# Patient Record
Sex: FEMALE | Race: WHITE | NOT HISPANIC OR LATINO | URBAN - METROPOLITAN AREA
[De-identification: names, ages, dates, MRNs, and addresses within clinical notes are randomized per-mention and may not be internally consistent; named-entity substitution may affect disease eponyms.]

---
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
## 2024-03-09 ENCOUNTER — NON-PROVIDER VISIT (OUTPATIENT)
Dept: URGENT CARE | Facility: PHYSICIAN GROUP | Age: 19
End: 2024-03-09

## 2024-03-09 DIAGNOSIS — Z11.1 PPD SCREENING TEST: Primary | ICD-10-CM

## 2024-03-09 PROCEDURE — 86580 TB INTRADERMAL TEST: CPT

## 2024-03-09 NOTE — PROGRESS NOTES
Suzan Delacruz is a 18 y.o. female here for a non-provider visit for PPD placement -- Step 1 of 1    Reason for PPD:  work requirement    1. TB evaluation questionnaire completed by patient? Yes      -  If any answers marked yes did you contact a provider prior to placing? Not Indicated  2.  Patient notified to return to clinic for reading on: Monday after 9:25am, Tuesdasy before 9:25am   3.  PPD Placement documentation completed on TB evaluation questionnaire? Yes  4.  Location of TB evaluation questionnaire filed:  [facility]

## 2024-03-11 ENCOUNTER — NON-PROVIDER VISIT (OUTPATIENT)
Dept: URGENT CARE | Facility: PHYSICIAN GROUP | Age: 19
End: 2024-03-11

## 2024-03-11 LAB — TB WHEAL 3D P 5 TU DIAM: 0 MM

## 2024-03-11 NOTE — PROGRESS NOTES
Suzan Delacruz is a 18 y.o. female here for a non-provider visit for PPD reading -- Step 1 of 1.      1.  Resulted in Epic under enter/edit results? Yes   2.  TB evaluation questionnaire scanned into chart and original given to patient?Yes      3. Was induration greater than 0 mm? No.      Routed to PCP? Yes

## 2024-04-16 ENCOUNTER — HOSPITAL ENCOUNTER (OUTPATIENT)
Dept: BEHAVIORAL HEALTH | Facility: MEDICAL CENTER | Age: 19
End: 2024-04-16
Attending: PSYCHIATRY & NEUROLOGY
Payer: COMMERCIAL

## 2024-04-16 DIAGNOSIS — F41.1 GAD (GENERALIZED ANXIETY DISORDER): ICD-10-CM

## 2024-04-16 PROCEDURE — 90791 PSYCH DIAGNOSTIC EVALUATION: CPT | Performed by: MARRIAGE & FAMILY THERAPIST

## 2024-04-16 ASSESSMENT — PATIENT HEALTH QUESTIONNAIRE - PHQ9
5. POOR APPETITE OR OVEREATING: 0 - NOT AT ALL
SUM OF ALL RESPONSES TO PHQ QUESTIONS 1-9: 9
CLINICAL INTERPRETATION OF PHQ2 SCORE: 3

## 2024-04-16 ASSESSMENT — ANXIETY QUESTIONNAIRES
4. TROUBLE RELAXING: NEARLY EVERY DAY
1. FEELING NERVOUS, ANXIOUS, OR ON EDGE: NEARLY EVERY DAY
2. NOT BEING ABLE TO STOP OR CONTROL WORRYING: NEARLY EVERY DAY
5. BEING SO RESTLESS THAT IT IS HARD TO SIT STILL: NEARLY EVERY DAY
7. FEELING AFRAID AS IF SOMETHING AWFUL MIGHT HAPPEN: SEVERAL DAYS
6. BECOMING EASILY ANNOYED OR IRRITABLE: NEARLY EVERY DAY
3. WORRYING TOO MUCH ABOUT DIFFERENT THINGS: NEARLY EVERY DAY
GAD7 TOTAL SCORE: 19

## 2024-04-16 NOTE — PROGRESS NOTES
"[facility]  INITIAL ASSESSMENT    Name: Suzan Delacruz  MRN: [id number]  : 2005  Age: 18 y.o.  Date of assessment: 2024  PCP: JUAN Jain M.D.  Persons in attendance: Patient  Total session time: 90 minutes      CHIEF COMPLAINT AND HISTORY OF PRESENTING PROBLEM:  (as stated by Patient):  Suzan Delacruz is a 18 y.o., White female referred for assessment by Dr. Kerry Ge.  Primary presenting issue includes No chief complaint on file.  Pt reports she was diagnosed with bipolar II depression and \"I get angry a lot.\" She said Dr. Ge recommended her to program.  Pt reports her therapist thought pt would benefit from intense therapy.  \"I do tend to lie to get out of things and not participate in group.\"  Pt reports she was admitted to [facility] about three years ago for suicide attempt. Pt reports she was in [place] going to college and she was feeling that  she could not focus.  \"I felt that I was slowly dying.  I wasn't going to class.  I am finishing on line.\"  Pt reports to be feeling anxious and mother notices she \"paces a lot.  The wheels are constantly turning.\"  Pt's mother reports that since pt has been home she had going out and has had sex with a couple of guys. When she comes home she feels guilty and has remorse.\"  Pt reports that she feels that she \"blacks out; and I don't really have emotion when I do stuff.\"  Mother reports that pt demonstrated similar behaviors around when COVID happened.\"  Mother reports that pt would \"sneak out of the house and not come home.  We were hearing things about pt having sex with random people and living recklessly.  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  Pt is tearful. She said she has had friend problems in middle adolescence.  \"I've lost a lot of friends.\"  Pt reports to experience \"impulsive behaviors.\"  Pt reports she fears being left out or " "forgotten.  Mother reports she noticed pt would \"lie\" and she noticed this became noticeable around 8th grade.  Pt reports that in 8th grade,friends at the time started to cut her out of their friend group.        3/20/2018     1:00 PM 4/16/2024    10:26 AM   Depression Screen (PHQ-2/PHQ-9)   PHQ-2 Total Score 0 3   PHQ-9 Total Score  9       Interpretation of PHQ-9 Total Score   Score Severity   1-4 No Depression   5-9 Mild Depression   10-14 Moderate Depression   15-19 Moderately Severe Depression   20-27 Severe Depression         4/16/2024    10:48 AM   JAMIE 7   JAMIE-7 Total Score 19       Interpretation of JAMIE 7 Total Score   Score Severity:  0-4 No Anxiety   5-9 Mild Anxiety  10-14 Moderate Anxiety  15-21 Severe Anxiety     FAMILY/SOCIAL HISTORY  Current living situation/household members: living at home with mother, father and brother.  Pt reports that her father \"doesn't really understand.  I feel I am disappointing my family.\"    Relevant family history/structure/dynamics: Pt is home from college; finishing course work on line and living with mother brother and father for the summer.  She plans to starts back to school online.    Current family/social stressors: Pt's family will be moving out of state withing the next six months.   Quality/quantity of current family and/or social support: mother is with pt today and she has a \"supportive family,\" even though she feels she is letting them down and they are disappointed in her.    Does patient/parent report a family history of behavioral health issues, diagnoses, or treatment? No  Family History   Problem Relation Age of Onset    Breast Cancer Mother     Diabetes Father     Breast Cancer Maternal Aunt     Diabetes Paternal Grandmother         BEHAVIORAL HEALTH TREATMENT HISTORY  Does patient/parent report a history of prior behavioral health treatment for patient? Yes:    Dates Level of Care Facilty/Provider Diagnosis/Problem Medications   Currently   [facility] " "[facility] Bipolar II  Lamotrigine; propranolol vraylar    After [facility]     [facility]  IP \"                                                          History of untreated behavioral health issues identified? No    MEDICAL HISTORY  Primary care behavioral health screenings: @PHQ@   Past medical/surgical history:   Past Medical History:   Diagnosis Date    Depression     Suicide attempt (HCC)       Past Surgical History:   Procedure Laterality Date    OTHER      ankle surgery    OTHER      wisdom teeth removal        Medication Allergies:  Patient has no known allergies.   Medical history provided by patient during current evaluation: none reported     Does patient/parent report nutritional concerns?  \"Im trying\"   Does patient/parent report change in appetite or weight loss/gain? Yes; lost 10 lbs since she has been home from college   Does patient/parent report history of eating disorder symptoms? Yes; binging   Does patient/parent report dental problem? No  Does patient/parent report physical pain? No     Does patient/parent report functional impact of medical, developmental, or pain issues?   no    EDUCATIONAL/LEARNING HISTORY  Is patient currently enrolled in a school/educational program? 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 No  Does patient identify impact of presenting issue on work functioning? No     HISTORY:  Does patient report current or past enlistment? No        SPIRITUAL/CULTURAL/IDENTITY:  What are the patient’s/family’s spiritual beliefs or practices? None reported   What is the patient’s cultural or ethnic background/identity? white  How does the patient identify their sexual orientation? Heterosexual   How does the patient identify their gender? She/her/hers  Does the patient identify any " "spiritual/cultural/identity factors as relevant to the presenting issue? No    LEGAL HISTORY  Has the patient ever been involved with juvenile, adult, or family legal systems? No   [If yes, trigger section below:]  Does patient report ever being a victim of a crime?  No  Does patient report involvement in any current legal issues?  No  Does patient report ever being arrested or committing a crime? No  Does patient report any current agency (parole/probation/CPS/) involvement? No    ABUSE/NEGLECT/TRAUMA SCREENING  Does patient report feeling “unsafe” in his/her home, or afraid of anyone? No  Does patient report any history of physical, sexual, or emotional abuse? No  Does parent or significant other report any of the above? No  Is there evidence of neglect by self? No  Is there evidence of neglect by a caregiver? No  Does the patient/parent report any history of CPS/APS/police involvement related to suspected abuse/neglect or domestic violence? No  Does the patient/parent report any other history of potentially traumatic life events? When she was in [facility] she said there was a kid who said he had the devil in him and he would tell me something was after me and I was terrified.\"  She became visibly upset as she recalled this event.    Based on the information provided during the current assessment, is a mandated report of suspected abuse/neglect being made?  No     SAFETY ASSESSMENT - SELF  Does patient acknowledge current or past symptoms of dangerousness to self? Yes; tried to hang herself in 2020; \"my dad grabbed me to keep me from hurting myself.\"   According to mother pt would \"threaten to kill herself.  She Put a belt around her neck and parents took her to [facility].  She was there for four days.  \"Thre was a kid who said he had the deveil in him and he would tell me something was after me and I was terrified.    Does parent/significant other report patient has current or past " symptoms of dangerousness to self? Yes:     Past Current    Suicidal Thoughts: [x]  []    Suicidal Plans: []  []    Suicidal Intent: []  []    Suicide Attempts: []  []    Self-Injury [x]  []      For any boxes checked above, provide detail: past SI, nothing current; pt reports putting a belt around her neck in the presence of her family and threatening to kill herself.      History of suicide by family member: no  History of suicide by friend/significant other: no  Recent change in frequency/specificity/intensity of suicidal thoughts or self-harm behavior? no  Current access to firearms, medications, or other identified means of suicide/self-harm? no  If yes, willing to restrict access to means of suicide/self-harm? yes -   Protective factors present:  Strong family connections      Recent change in frequency/specificity/intensity of suicidal thoughts or self-harm behavior? No  Current access to firearms, medications, or other identified means of suicide/self-harm? No  If yes, willing to restrict access to means of suicide/self-harm?  N/a    [place] Suicide Severity Rating Scale     Wish to be Dead?: No  Suicidal Thoughts: No    Suicidal Thoughts with Method Without Specific Plan or Intent to Act:    Suicidal Intent Without Specific Plan:    Suicide Intent with Specific Plan:    Suicide Behavior Question: No  How long ago did you do any of these?:    C-SSRS Risk Level: No Risk    Additional Suicide Screening Questions    Suspected or Confirmed Suicide Attempted?: No  Harming or killing others?: No    [place] Suicide Reassessment     New or continued thoughts about killing self?: No  Preparing to end life?: No   Current Suicide Risk: Low  Crisis Safety Plan completed and copy given to patient: No    SAFETY ASSESSMENT - OTHERS  Does paor past symptoms of aggressive behavior or risk to others? No  Does parent/significant othtient acknowledge current or past symptoms of aggressive behavior or risk to others? No  Does  "parent/significant other report patient has current or past symptoms of aggressive behavior or risk to others? No    Recent change in frequency/specificity/intensity of thoughts or threats to harm others? No  Current access to firearms/other identified means of harm? No    Current Homicide Risk:  Low  Crisis Safety Plan completed and copy given to patient? No  Based on information provided during the current assessment, is a mandated “duty to warn” being exercised? No    SUBSTANCE USE/ADDICTION HISTORY  [] Not applicable - patient 10 years of age or younger    Is there a family history of substance use/addiction? No  Does patient acknowledge or parent/significant other report use of/dependence on substances? No  Last time patient used alcohol: two weeks ago (2 drinks) Within the past week? No  Last time patient used marijuana: none  Within the past month? No  Any other street drugs ever tried even once? No  Any use of prescription medications/pills without a prescription, or for reasons others than originally prescribed?  No  Any other addictive behavior reported (gambling, shopping, sex)? No     Drug History:  Amphetamine:  Amphetamine frequency: Never used      Cannibis:  Cannabis frequency: Never used      Cocaine:  Cocaine frequency: Never used      Ecstasy:  Ecstasy frequency: Never used      Hallucinogen:  Hallucinogen frequency: Never used      Inhalant:   Inhalant frequency: Never used      Opiate:  Opiate frequency: Never used  Cannabis frequency: Never used      Other:  Other drug frequency: Never used      Sedative:   Sedative frequency: Never used          [] Patient denies use of any substance/addictive behaviors    STRENGTHS/ASSETS  Strengths Identified by interviewer: Family suppport  Strengths Identified by patient: \"I will try to figure that out.\"      MENTAL STATUS/OBSERVATIONS   Participation: Active verbal participation  Grooming: Casual  Orientation:Alert and Fully Oriented   Behavior: " Tense  Eye contact: Good   Mood:Anxious  Affect:Constricted  Thought process: Logical  Thought content:  Within normal limits  Speech: Rate within normal limits and Volume within normal limits  Perception: Within normal limits  Memory: No gross evidence of memory deficits  Insight: Limited  Judgment:  Adequate  Other:    Family/couple interaction observations: mother was present and very supportive and expressed feeling worried         CLINICAL FORMULATION:   Pt endorses symptoms consistent with diagnoses of mild depression and severe anxiety.  She said she feels nervous, anxious or on edge, has difficulty controlling worry, has trouble relaxing and becomes annoyed or irritable  nearly every day.  She also reports to feel bad about herself and that she has let her family down nearly every day.  Pt. Reports she is seeing Dr. Man whom reportedly had diagnosed her with Bipolar II Disorder and is treating her.   Pt also endorses impulsivity and questionable decision making  since she has been home from college.  Pt. will benefit from participation in IOP three days per week, approximately three hours per day for approximately five weeks to reduce symptoms of depression and anxiety.          DIAGNOSTIC IMPRESSION(S):  1. JAMIE (generalized anxiety disorder)          IDENTIFIED NEEDS/PLAN:  [If any of these marked, trigger DISPOSITION list]  Mood/anxiety  Refer to [facility]: Intensive Outpatient Program    Does patient express agreement with the above plan? Yes     Referral appointment(s) scheduled?  Pt will determine which program she would like to participate in.         ELLIS Ramirez.

## 2024-05-17 ENCOUNTER — OFFICE VISIT (OUTPATIENT)
Dept: URGENT CARE | Facility: PHYSICIAN GROUP | Age: 19
End: 2024-05-17
Payer: COMMERCIAL

## 2024-05-17 VITALS
WEIGHT: 177 LBS | BODY MASS INDEX: 29.49 KG/M2 | SYSTOLIC BLOOD PRESSURE: 100 MMHG | OXYGEN SATURATION: 99 % | HEART RATE: 66 BPM | HEIGHT: 65 IN | TEMPERATURE: 97.7 F | DIASTOLIC BLOOD PRESSURE: 72 MMHG | RESPIRATION RATE: 16 BRPM

## 2024-05-17 DIAGNOSIS — J02.9 PHARYNGITIS, UNSPECIFIED ETIOLOGY: ICD-10-CM

## 2024-05-17 LAB — S PYO DNA SPEC NAA+PROBE: NOT DETECTED

## 2024-05-17 PROCEDURE — 3078F DIAST BP <80 MM HG: CPT | Performed by: FAMILY MEDICINE

## 2024-05-17 PROCEDURE — 99203 OFFICE O/P NEW LOW 30 MIN: CPT | Performed by: FAMILY MEDICINE

## 2024-05-17 PROCEDURE — 87651 STREP A DNA AMP PROBE: CPT | Performed by: FAMILY MEDICINE

## 2024-05-17 PROCEDURE — 3074F SYST BP LT 130 MM HG: CPT | Performed by: FAMILY MEDICINE

## 2024-05-17 RX ORDER — PREDNISONE 20 MG/1
60 TABLET ORAL ONCE
Qty: 3 TABLET | Refills: 0 | Status: SHIPPED | OUTPATIENT
Start: 2024-05-17 | End: 2024-05-17

## 2024-05-17 ASSESSMENT — ENCOUNTER SYMPTOMS
EYE DISCHARGE: 0
VOMITING: 0
NAUSEA: 0
EYE REDNESS: 0
WEIGHT LOSS: 0
MYALGIAS: 0

## 2024-05-17 NOTE — PROGRESS NOTES
"Subjective     Suzan Delacruz is a 19 y.o. female who presents with Pharyngitis (Sore throat on (R) side, (R) ear pain X 2 days )            2 days right-sided sore throat.  Right earache.  No drainage from ear.  No trauma or barotrauma.  No fever.  No rash.  No cough.  Tolerating fluids with normal urine output.  No other aggravating or alleviating factors.        Review of Systems   Constitutional:  Negative for malaise/fatigue and weight loss.   Eyes:  Negative for discharge and redness.   Gastrointestinal:  Negative for nausea and vomiting.   Musculoskeletal:  Negative for joint pain and myalgias.   Skin:  Negative for itching and rash.              Objective     /72 (BP Location: Right arm, Patient Position: Sitting, BP Cuff Size: Adult)   Pulse 66   Temp 36.5 °C (97.7 °F) (Temporal)   Resp 16   Ht 1.651 m (5' 5\")   Wt 80.3 kg (177 lb)   SpO2 99%   BMI 29.45 kg/m²      Physical Exam  Constitutional:       General: She is not in acute distress.     Appearance: She is well-developed.   HENT:      Head: Normocephalic and atraumatic.      Right Ear: Tympanic membrane normal.      Left Ear: Tympanic membrane normal.      Nose: Nose normal.      Mouth/Throat:      Mouth: Mucous membranes are moist.      Pharynx: Posterior oropharyngeal erythema present.   Eyes:      Conjunctiva/sclera: Conjunctivae normal.   Cardiovascular:      Rate and Rhythm: Normal rate and regular rhythm.      Heart sounds: Normal heart sounds. No murmur heard.  Pulmonary:      Effort: Pulmonary effort is normal.      Breath sounds: Normal breath sounds. No wheezing.   Musculoskeletal:      Cervical back: Neck supple.   Lymphadenopathy:      Cervical: No cervical adenopathy.   Skin:     General: Skin is warm and dry.      Findings: No rash.   Neurological:      Mental Status: She is alert.                             Assessment & Plan   POCT PCR strep negative     1. Pharyngitis, unspecified etiology    - predniSONE (DELTASONE) " 20 MG Tab; Take 3 Tablets by mouth one time for 1 dose.  Dispense: 3 Tablet; Refill: 0  - POCT CEPHEID GROUP A STREP - PCR     Differential diagnosis, natural history, supportive care, and indications for immediate follow-up were discussed.

## 2024-05-25 ENCOUNTER — HOSPITAL ENCOUNTER (EMERGENCY)
Facility: MEDICAL CENTER | Age: 19
End: 2024-05-25
Attending: STUDENT IN AN ORGANIZED HEALTH CARE EDUCATION/TRAINING PROGRAM
Payer: COMMERCIAL

## 2024-05-25 VITALS
DIASTOLIC BLOOD PRESSURE: 73 MMHG | RESPIRATION RATE: 17 BRPM | SYSTOLIC BLOOD PRESSURE: 112 MMHG | TEMPERATURE: 98.1 F | HEART RATE: 73 BPM | BODY MASS INDEX: 29.45 KG/M2 | OXYGEN SATURATION: 95 % | WEIGHT: 177 LBS

## 2024-05-25 DIAGNOSIS — F31.9 BIPOLAR 1 DISORDER (HCC): ICD-10-CM

## 2024-05-25 DIAGNOSIS — T50.901A ACCIDENTAL OVERDOSE, INITIAL ENCOUNTER: ICD-10-CM

## 2024-05-25 LAB
ALBUMIN SERPL BCP-MCNC: 4 G/DL (ref 3.2–4.9)
ALBUMIN/GLOB SERPL: 1.4 G/DL
ALP SERPL-CCNC: 67 U/L (ref 30–99)
ALT SERPL-CCNC: 15 U/L (ref 2–50)
ANION GAP SERPL CALC-SCNC: 12 MMOL/L (ref 7–16)
APAP SERPL-MCNC: <5 UG/ML (ref 10–30)
AST SERPL-CCNC: 16 U/L (ref 12–45)
BASOPHILS # BLD AUTO: 0.1 % (ref 0–1.8)
BASOPHILS # BLD: 0.01 K/UL (ref 0–0.12)
BILIRUB SERPL-MCNC: 0.5 MG/DL (ref 0.1–1.5)
BUN SERPL-MCNC: 13 MG/DL (ref 8–22)
CALCIUM ALBUM COR SERPL-MCNC: 9.5 MG/DL (ref 8.5–10.5)
CALCIUM SERPL-MCNC: 9.5 MG/DL (ref 8.5–10.5)
CHLORIDE SERPL-SCNC: 107 MMOL/L (ref 96–112)
CO2 SERPL-SCNC: 23 MMOL/L (ref 20–33)
CREAT SERPL-MCNC: 0.87 MG/DL (ref 0.5–1.4)
EKG IMPRESSION: NORMAL
EOSINOPHIL # BLD AUTO: 0.12 K/UL (ref 0–0.51)
EOSINOPHIL NFR BLD: 1.5 % (ref 0–6.9)
ERYTHROCYTE [DISTWIDTH] IN BLOOD BY AUTOMATED COUNT: 42.7 FL (ref 35.9–50)
ETHANOL BLD-MCNC: <10.1 MG/DL
GFR SERPLBLD CREATININE-BSD FMLA CKD-EPI: 97 ML/MIN/1.73 M 2
GLOBULIN SER CALC-MCNC: 2.8 G/DL (ref 1.9–3.5)
GLUCOSE SERPL-MCNC: 91 MG/DL (ref 65–99)
HCG SERPL QL: NEGATIVE
HCT VFR BLD AUTO: 40.9 % (ref 37–47)
HGB BLD-MCNC: 13.7 G/DL (ref 12–16)
IMM GRANULOCYTES # BLD AUTO: 0.02 K/UL (ref 0–0.11)
IMM GRANULOCYTES NFR BLD AUTO: 0.3 % (ref 0–0.9)
LYMPHOCYTES # BLD AUTO: 1.87 K/UL (ref 1–4.8)
LYMPHOCYTES NFR BLD: 23.9 % (ref 22–41)
MCH RBC QN AUTO: 31.3 PG (ref 27–33)
MCHC RBC AUTO-ENTMCNC: 33.5 G/DL (ref 32.2–35.5)
MCV RBC AUTO: 93.4 FL (ref 81.4–97.8)
MONOCYTES # BLD AUTO: 0.59 K/UL (ref 0–0.85)
MONOCYTES NFR BLD AUTO: 7.6 % (ref 0–13.4)
NEUTROPHILS # BLD AUTO: 5.2 K/UL (ref 1.82–7.42)
NEUTROPHILS NFR BLD: 66.6 % (ref 44–72)
NRBC # BLD AUTO: 0 K/UL
NRBC BLD-RTO: 0 /100 WBC (ref 0–0.2)
PLATELET # BLD AUTO: 297 K/UL (ref 164–446)
PMV BLD AUTO: 10.7 FL (ref 9–12.9)
POTASSIUM SERPL-SCNC: 4 MMOL/L (ref 3.6–5.5)
PROT SERPL-MCNC: 6.8 G/DL (ref 6–8.2)
RBC # BLD AUTO: 4.38 M/UL (ref 4.2–5.4)
SALICYLATES SERPL-MCNC: <1 MG/DL (ref 15–25)
SODIUM SERPL-SCNC: 142 MMOL/L (ref 135–145)
WBC # BLD AUTO: 7.8 K/UL (ref 4.8–10.8)

## 2024-05-25 ASSESSMENT — PAIN DESCRIPTION - PAIN TYPE: TYPE: ACUTE PAIN

## 2024-05-26 NOTE — ED PROVIDER NOTES
"  ER Provider Note    Scribed for Joseph Mohan M.D. by Marciano Duque. 5/25/2024   8:23 PM    Primary Care Provider: JUAN Jain M.D.    CHIEF COMPLAINT  Chief Complaint   Patient presents with    Other     BIB [facility] from home after parents called EMS for apparently witnessing patient take excessive amount of propanolol and lamotrigine. Per patient she is not suicidal and was having a panic attack and may have taken too many by accident. Per patient she may have took 3 of each. After this parents witnessed patient fall and strike head on pool table. +LOC -thinners     EXTERNAL RECORDS REVIEWED  Other None    HPI/ROS  LIMITATION TO HISTORY   Select: : None  OUTSIDE HISTORIAN(S):  Family mother notes the patient became extremely overwhelmed after some interpersonal conflict with friends, and was panicking and took her medications earlier today.  She agrees the patient was not attempting suicide    Suzan Delacruz is a 19 y.o. female who presents to the ED by ambulance for evaluation of a possible overdose onset prior to arrival. The patient reports that she was having a panic attack when she accidentally took 2 extra Propanolol and lamotrigine. She denies suicidal ideation, and notes that she was took the medications since she was \"freaking out\". Patient states that afterwards, she lost consciousness and hit her head while falling. She adds not eating today. The patient notes that her panic attack has since alleviated. She states that she was admitted inpatient years ago, and is currently seeing a psychiatrist. No exacerbating factors noted. No known drug allergies.    PAST MEDICAL HISTORY  Past Medical History:   Diagnosis Date    Depression     Suicide attempt (HCC)      SURGICAL HISTORY  Past Surgical History:   Procedure Laterality Date    OTHER      ankle surgery    OTHER      wisdom teeth removal     FAMILY HISTORY  Family History   Problem Relation Age of Onset    Breast Cancer Mother     Diabetes " Father     Breast Cancer Maternal Aunt     Diabetes Paternal Grandmother      SOCIAL HISTORY   reports that she has never smoked. She has never used smokeless tobacco. She reports current alcohol use. She reports that she does not use drugs.    CURRENT MEDICATIONS  Discharge Medication List as of 5/25/2024 10:02 PM        CONTINUE these medications which have NOT CHANGED    Details   Cholecalciferol (VITAMIN D3 PO) Take  by mouth., Historical Med           ALLERGIES  No Known Allergies     PHYSICAL EXAM  /66   Pulse 79   Temp 36.9 °C (98.5 °F) (Temporal)   Resp 20   Wt 80.3 kg (177 lb)   LMP  (LMP Unknown)   SpO2 98%   BMI 29.45 kg/m²    General: Non-toxic, pleasant, in no respiratory distress.  No toxidrome.  Head: Normocephalic atraumatic  Eyes: Extraocular motion intact  Neck: Supple, no rigidity  Cardiovascular: Regular rate and rhythm no murmurs rubs or gallops  Respiratory: Clear to auscultation bilaterally, equal chest rise and fall, no increased work of breathing  Abdomen: Soft nontender no guarding  Musculoskeletal: Warm and well perfused, no peripheral edema  Neuro: Alert, no focal deficits  Integumentary: No wounds or rashes    DIAGNOSTIC STUDIES    Labs:   Labs Reviewed   ACETAMINOPHEN - Abnormal; Notable for the following components:       Result Value    Acetaminophen -Tylenol <5.0 (*)     All other components within normal limits   SALICYLATE - Abnormal; Notable for the following components:    Salicylates, Quant. <1.0 (*)     All other components within normal limits   CBC WITH DIFFERENTIAL   COMP METABOLIC PANEL   DIAGNOSTIC ALCOHOL   HCG QUAL SERUM   ESTIMATED GFR   LAMOTRIGINE       EKG:   I have independently interpreted this EKG as detailed above.  Results for orders placed or performed during the hospital encounter of 05/25/24   EKG   Result Value Ref Range    Report       [facility] Emergency Dept.    Test Date:  2024-05-25  Pt Name:    DEANNA QUETA                  Department: ER  MRN:        [id number]                      Room:       RD 02  Gender:     Female                       Technician: [id number]  :        2005                   Requested By:ER TRIAGE PROTOCOL  Order #:    [id number]                    Reading MD: Nickolas Mohan    Measurements  Intervals                                Axis  Rate:       76                           P:          43  DC:         160                          QRS:        74  QRSD:       85                           T:          29  QT:         357  QTc:        402    Interpretive Statements  Sinus rhythm, rate of 76, normal axis normal intervals no ST elevations, no  AV block  Electronically Signed On 2024 22:01:09 PDT by Nickolas Mohan            INITIAL ASSESSMENT COURSE AND PLAN  Care Narrative 19-year-old female with a history of bipolar disorder, on multiple medications, who reportedly took too many propranolol in a panic, total dose of approximately 40 mg prior to arrival.  Patient was observed here on the cardiac monitor for almost 3 hours, she had no bradycardia, no AV block, and she is well-appearing, and not near syncopal.  At this time, I feel the patient is appropriate for outpatient follow-up, she is invited back if she changes her mind, I do not suspect an intentional overdose at this time although initially it was unclear, her additional toxicologic workup is reassuring.    8:23 PM - Patient was evaluated at bedside. Ordered for Acetaminophen, Salicylate, Beta-HCG Qualitative Serum, Lamotrigine, CBC w/ diff., CMP, and Diagnostic Alcohol to evaluate. Patient verbalizes understanding and support with my plan of care.  Differential diagnoses include but not limited to: Accidental overdose, anxiety attack, arrhythmia, AV block    9:53 PM - Patient was reevaluated at bedside. Her mother was present as well and noted the patient will get panic attacks when people get mad at her or other social situations do not go well.  Patient requests outpatient therapy and inquires over her options.    11:01 PM - Reviewed patient's laboratory, imaging results at the bedside, patient is agreeable to discharge, we discussed strict return precautions, and outpatient follow-up, patient was discharged in no acute distress.  All questions were answered prior to discharge.  Patient is discharged with her mother.        DISPOSITION AND DISCUSSIONS    I have discussed management of the patient with the following physicians and SHERIN's:  None    Discussion of management with other [facility] or appropriate source(s): None     Barriers to care at this time, including but not limited to:  No known barriers .       The patient will return for new or worsening symptoms and is stable at the time of discharge.    The patient is referred to a primary physician for blood pressure management, diabetic screening, and for all other preventative health concerns.    DISPOSITION:  Patient will be discharged home in stable condition.    FOLLOW UP:  No follow-up provider specified.    OUTPATIENT MEDICATIONS:  Discharge Medication List as of 5/25/2024 10:02 PM        FINAL DIAGNOSIS  1. Accidental overdose, initial encounter    2. Bipolar 1 disorder (HCC)         Marciano MACIAS (Gonzalo), am scribing for, and in the presence of, Nickolas Mohan M.D..    Electronically signed by: Marciano Duque (Gonzalo), 5/25/2024    Nickolas MACIAS M.D. personally performed the services described in this documentation, as scribed by Marciano Duque in my presence, and it is both accurate and complete.      The note accurately reflects work and decisions made by me.  Nickolas Mohan M.D.  5/26/2024  1:10 AM

## 2024-05-26 NOTE — ED TRIAGE NOTES
Chief Complaint   Patient presents with    Other     BIB [facility] from home after parents called EMS for apparently witnessing patient take excessive amount of propanolol and lamotrigine. Per patient she is not suicidal and was having a panic attack and may have taken too many by accident. Per patient she may have took 3 of each. After this parents witnessed patient fall and strike head on pool table. +LOC -thinners     Vitals:    05/25/24 1933   BP: 123/66   Pulse: 79   Resp: 20   Temp: 36.9 °C (98.5 °F)   SpO2: 98%

## 2024-05-26 NOTE — DISCHARGE INSTRUCTIONS
Avoid taking your propranolol till tomorrow, return for new concerning symptoms, resume taking your other medications as prescribed.  Call 911 or return me if you do not feel safe at home.

## 2024-05-27 LAB — LAMOTRIGINE SERPL-MCNC: 2 UG/ML (ref 3–15)

## 2024-05-28 ENCOUNTER — APPOINTMENT (OUTPATIENT)
Dept: OBGYN | Facility: CLINIC | Age: 19
End: 2024-05-28
Payer: COMMERCIAL

## 2024-07-14 ENCOUNTER — OFFICE VISIT (OUTPATIENT)
Dept: URGENT CARE | Facility: PHYSICIAN GROUP | Age: 19
End: 2024-07-14
Payer: COMMERCIAL

## 2024-07-14 VITALS
WEIGHT: 170 LBS | TEMPERATURE: 97.8 F | HEIGHT: 65 IN | HEART RATE: 76 BPM | OXYGEN SATURATION: 97 % | DIASTOLIC BLOOD PRESSURE: 72 MMHG | RESPIRATION RATE: 12 BRPM | SYSTOLIC BLOOD PRESSURE: 106 MMHG | BODY MASS INDEX: 28.32 KG/M2

## 2024-07-14 DIAGNOSIS — J06.9 VIRAL URI WITH COUGH: ICD-10-CM

## 2024-07-14 PROCEDURE — 3074F SYST BP LT 130 MM HG: CPT | Performed by: STUDENT IN AN ORGANIZED HEALTH CARE EDUCATION/TRAINING PROGRAM

## 2024-07-14 PROCEDURE — 3078F DIAST BP <80 MM HG: CPT | Performed by: STUDENT IN AN ORGANIZED HEALTH CARE EDUCATION/TRAINING PROGRAM

## 2024-07-14 PROCEDURE — 99203 OFFICE O/P NEW LOW 30 MIN: CPT | Performed by: STUDENT IN AN ORGANIZED HEALTH CARE EDUCATION/TRAINING PROGRAM

## 2024-07-14 ASSESSMENT — FIBROSIS 4 INDEX: FIB4 SCORE: 0.29

## 2024-07-17 ENCOUNTER — APPOINTMENT (OUTPATIENT)
Dept: OBGYN | Facility: CLINIC | Age: 19
End: 2024-07-17
Payer: COMMERCIAL

## 2024-07-17 ENCOUNTER — HOSPITAL ENCOUNTER (OUTPATIENT)
Facility: MEDICAL CENTER | Age: 19
End: 2024-07-17
Attending: PHYSICIAN ASSISTANT
Payer: COMMERCIAL

## 2024-07-17 VITALS
WEIGHT: 175.6 LBS | DIASTOLIC BLOOD PRESSURE: 72 MMHG | HEIGHT: 65 IN | BODY MASS INDEX: 29.26 KG/M2 | SYSTOLIC BLOOD PRESSURE: 115 MMHG

## 2024-07-17 DIAGNOSIS — Z30.011 ENCOUNTER FOR INITIAL PRESCRIPTION OF CONTRACEPTIVE PILLS: ICD-10-CM

## 2024-07-17 DIAGNOSIS — Z12.4 SCREENING FOR CERVICAL CANCER: ICD-10-CM

## 2024-07-17 DIAGNOSIS — Z01.419 WELL WOMAN EXAM: ICD-10-CM

## 2024-07-17 LAB — AMBIGUOUS DTTM AMBI4: NORMAL

## 2024-07-17 PROCEDURE — 87591 N.GONORRHOEAE DNA AMP PROB: CPT

## 2024-07-17 PROCEDURE — 88142 CYTOPATH C/V THIN LAYER: CPT

## 2024-07-17 PROCEDURE — 3078F DIAST BP <80 MM HG: CPT | Performed by: PHYSICIAN ASSISTANT

## 2024-07-17 PROCEDURE — 87491 CHLMYD TRACH DNA AMP PROBE: CPT

## 2024-07-17 PROCEDURE — 3074F SYST BP LT 130 MM HG: CPT | Performed by: PHYSICIAN ASSISTANT

## 2024-07-17 PROCEDURE — 99385 PREV VISIT NEW AGE 18-39: CPT | Performed by: PHYSICIAN ASSISTANT

## 2024-07-17 RX ORDER — LEVONORGESTREL/ETHIN.ESTRADIOL 0.1-0.02MG
1 TABLET ORAL DAILY
Qty: 84 TABLET | Refills: 3 | Status: SHIPPED | OUTPATIENT
Start: 2024-07-17

## 2024-07-17 ASSESSMENT — FIBROSIS 4 INDEX: FIB4 SCORE: 0.29

## 2024-07-24 LAB
C TRACH RRNA CVX QL NAA+PROBE: NEGATIVE
CYTOLOGIST CVX/VAG CYTO: ABNORMAL
CYTOLOGY CVX/VAG DOC CYTO: ABNORMAL
DX ICD CODE: ABNORMAL
N GONORRHOEA RRNA CVX QL NAA+PROBE: NEGATIVE
NOTE NL11727A: ABNORMAL
OTHER STN SPEC: ABNORMAL
PATHOLOGIST CVX/VAG CYTO: ABNORMAL
STAT OF ADQ CVX/VAG CYTO-IMP: ABNORMAL

## 2024-07-25 PROBLEM — R87.620 ATYPICAL SQUAMOUS CELL CHANGES OF UNDETERMINED SIGNIFICANCE (ASCUS) ON VAGINAL CYTOLOGY: Status: ACTIVE | Noted: 2024-07-25

## 2024-08-05 ENCOUNTER — APPOINTMENT (RX ONLY)
Dept: URBAN - METROPOLITAN AREA CLINIC 15 | Facility: CLINIC | Age: 19
Setting detail: DERMATOLOGY
End: 2024-08-05

## 2024-08-05 DIAGNOSIS — Z71.89 OTHER SPECIFIED COUNSELING: ICD-10-CM

## 2024-08-05 DIAGNOSIS — D22 MELANOCYTIC NEVI: ICD-10-CM

## 2024-08-05 DIAGNOSIS — L70.0 ACNE VULGARIS: ICD-10-CM | Status: INADEQUATELY CONTROLLED

## 2024-08-05 PROBLEM — D22.0 MELANOCYTIC NEVI OF LIP: Status: ACTIVE | Noted: 2024-08-05

## 2024-08-05 PROCEDURE — ? TREATMENT GOALS

## 2024-08-05 PROCEDURE — ? SUNSCREEN RECOMMENDATIONS

## 2024-08-05 PROCEDURE — ? OBSERVATION AND MEASURE

## 2024-08-05 PROCEDURE — 99214 OFFICE O/P EST MOD 30 MIN: CPT

## 2024-08-05 PROCEDURE — ? PHOTO-DOCUMENTATION

## 2024-08-05 PROCEDURE — ? DIAGNOSIS COMMENT

## 2024-08-05 PROCEDURE — ? PRESCRIPTION

## 2024-08-05 PROCEDURE — ? COUNSELING

## 2024-08-05 PROCEDURE — ? TREATMENT REGIMEN

## 2024-08-05 PROCEDURE — ? ADDITIONAL NOTES

## 2024-08-05 RX ORDER — CLINDAMYCIN PHOSPHATE 10 MG/ML
1 LOTION TOPICAL QD
Qty: 60 | Refills: 11 | Status: ERX | COMMUNITY
Start: 2024-08-05

## 2024-08-05 RX ORDER — TRETIONIN 0.25 MG/G
1 CREAM TOPICAL QHS
Qty: 20 | Refills: 11 | Status: ERX | COMMUNITY
Start: 2024-08-05

## 2024-08-05 RX ADMIN — TRETIONIN 1: 0.25 CREAM TOPICAL at 00:00

## 2024-08-05 RX ADMIN — CLINDAMYCIN PHOSPHATE 1: 10 LOTION TOPICAL at 00:00

## 2024-08-05 ASSESSMENT — LOCATION ZONE DERM
LOCATION ZONE: FACE
LOCATION ZONE: LIP

## 2024-08-05 ASSESSMENT — LOCATION DETAILED DESCRIPTION DERM
LOCATION DETAILED: RIGHT INFERIOR VERMILION LIP
LOCATION DETAILED: RIGHT CENTRAL MALAR CHEEK

## 2024-08-05 ASSESSMENT — LOCATION SIMPLE DESCRIPTION DERM
LOCATION SIMPLE: RIGHT CHEEK
LOCATION SIMPLE: RIGHT LIP

## 2024-08-05 NOTE — PROCEDURE: ADDITIONAL NOTES
Detail Level: Detailed
Render Risk Assessment In Note?: no
Additional Notes: Pt [place] to [place], will call clinic with any issues, refill requests or to move up percentage on tretinoin

## 2024-08-05 NOTE — PROCEDURE: TREATMENT REGIMEN
Detail Level: Zone
Initiate Treatment: Benzoyl Peroxide Wash (OTC): wash face daily when showering\\nClindamycin 1%: apply thin layer to face after washing with BP wash\\nTretinoin: apply pea sized amount to face at bedtime, avoiding eyelids
Plan: Change pillow case twice weekly. Separate towel for face and body
Samples Given: Cetaphil gentle products

## 2024-08-05 NOTE — PROCEDURE: DIAGNOSIS COMMENT
Render Risk Assessment In Note?: no
Comment: Patient counseled in step process of acne at this severity. Will start on topical regimen for 3 months, then consider oral antibioics and eventually Accutane if not improving. Counseled in daily treatment regimen and given handout for patient to follow.
Detail Level: Simple

## 2024-08-05 NOTE — PROCEDURE: COUNSELING
Isotretinoin Pregnancy And Lactation Text: This medication is Pregnancy Category X and is considered extremely dangerous during pregnancy. It is unknown if it is excreted in breast milk.
Doxycycline Pregnancy And Lactation Text: This medication is Pregnancy Category D and not consider safe during pregnancy. It is also excreted in breast milk but is considered safe for shorter treatment courses.
High Dose Vitamin A Pregnancy And Lactation Text: High dose vitamin A therapy is contraindicated during pregnancy and breast feeding.
Minocycline Pregnancy And Lactation Text: This medication is Pregnancy Category D and not consider safe during pregnancy. It is also excreted in breast milk.
Winlevi Pregnancy And Lactation Text: This medication is considered safe during pregnancy and breastfeeding.
Benzoyl Peroxide Pregnancy And Lactation Text: This medication is Pregnancy Category C. It is unknown if benzoyl peroxide is excreted in breast milk.
Azelaic Acid Counseling: Patient counseled that medicine may cause skin irritation and to avoid applying near the eyes.  In the event of skin irritation, the patient was advised to reduce the amount of the drug applied or use it less frequently.   The patient verbalized understanding of the proper use and possible adverse effects of azelaic acid.  All of the patient's questions and concerns were addressed.
Aklief Pregnancy And Lactation Text: It is unknown if this medication is safe to use during pregnancy.  It is unknown if this medication is excreted in breast milk.  Breastfeeding women should use the topical cream on the smallest area of the skin for the shortest time needed while breastfeeding.  Do not apply to nipple and areola.
High Dose Vitamin A Counseling: Side effects reviewed, pt to contact office should one occur.
Spironolactone Counseling: Patient advised regarding risks of diarrhea, abdominal pain, hyperkalemia, birth defects (for female patients), liver toxicity and renal toxicity. The patient may need blood work to monitor liver and kidney function and potassium levels while on therapy. The patient verbalized understanding of the proper use and possible adverse effects of spironolactone.  All of the patient's questions and concerns were addressed.
Azithromycin Counseling:  I discussed with the patient the risks of azithromycin including but not limited to GI upset, allergic reaction, drug rash, diarrhea, and yeast infections.
Bactrim Pregnancy And Lactation Text: This medication is Pregnancy Category D and is known to cause fetal risk.  It is also excreted in breast milk.
Isotretinoin Counseling: Patient should get monthly blood tests, not donate blood, not drive at night if vision affected, not share medication, and not undergo elective surgery for 6 months after tx completed. Side effects reviewed, pt to contact office should one occur.
Erythromycin Counseling:  I discussed with the patient the risks of erythromycin including but not limited to GI upset, allergic reaction, drug rash, diarrhea, increase in liver enzymes, and yeast infections.
Birth Control Pills Counseling: Birth Control Pill Counseling: I discussed with the patient the potential side effects of OCPs including but not limited to increased risk of stroke, heart attack, thrombophlebitis, deep venous thrombosis, hepatic adenomas, breast changes, GI upset, headaches, and depression.  The patient verbalized understanding of the proper use and possible adverse effects of OCPs. All of the patient's questions and concerns were addressed.
Bactrim Counseling:  I discussed with the patient the risks of sulfa antibiotics including but not limited to GI upset, allergic reaction, drug rash, diarrhea, dizziness, photosensitivity, and yeast infections.  Rarely, more serious reactions can occur including but not limited to aplastic anemia, agranulocytosis, methemoglobinemia, blood dyscrasias, liver or kidney failure, lung infiltrates or desquamative/blistering drug rashes.
Topical Clindamycin Pregnancy And Lactation Text: This medication is Pregnancy Category B and is considered safe during pregnancy. It is unknown if it is excreted in breast milk.
Birth Control Pills Pregnancy And Lactation Text: This medication should be avoided if pregnant and for the first 30 days post-partum.
Dapsone Pregnancy And Lactation Text: This medication is Pregnancy Category C and is not considered safe during pregnancy or breast feeding.
Winlevi Counseling:  I discussed with the patient the risks of topical clascoterone including but not limited to erythema, scaling, itching, and stinging. Patient voiced their understanding.
Use Enhanced Medication Counseling?: No
Topical Retinoid Pregnancy And Lactation Text: This medication is Pregnancy Category C. It is unknown if this medication is excreted in breast milk.
Azelaic Acid Pregnancy And Lactation Text: This medication is considered safe during pregnancy and breast feeding.
Erythromycin Pregnancy And Lactation Text: This medication is Pregnancy Category B and is considered safe during pregnancy. It is also excreted in breast milk.
Tetracycline Counseling: Patient counseled regarding possible photosensitivity and increased risk for sunburn.  Patient instructed to avoid sunlight, if possible.  When exposed to sunlight, patients should wear protective clothing, sunglasses, and sunscreen.  The patient was instructed to call the office immediately if the following severe adverse effects occur:  hearing changes, easy bruising/bleeding, severe headache, or vision changes.  The patient verbalized understanding of the proper use and possible adverse effects of tetracycline.  All of the patient's questions and concerns were addressed. Patient understands to avoid pregnancy while on therapy due to potential birth defects.
Benzoyl Peroxide Counseling: Patient counseled that medicine may cause skin irritation and bleach clothing.  In the event of skin irritation, the patient was advised to reduce the amount of the drug applied or use it less frequently.   The patient verbalized understanding of the proper use and possible adverse effects of benzoyl peroxide.  All of the patient's questions and concerns were addressed.
Azithromycin Pregnancy And Lactation Text: This medication is considered safe during pregnancy and is also secreted in breast milk.
Minocycline Counseling: Patient advised regarding possible photosensitivity and discoloration of the teeth, skin, lips, tongue and gums.  Patient instructed to avoid sunlight, if possible.  When exposed to sunlight, patients should wear protective clothing, sunglasses, and sunscreen.  The patient was instructed to call the office immediately if the following severe adverse effects occur:  hearing changes, easy bruising/bleeding, severe headache, or vision changes.  The patient verbalized understanding of the proper use and possible adverse effects of minocycline.  All of the patient's questions and concerns were addressed.
Dapsone Counseling: I discussed with the patient the risks of dapsone including but not limited to hemolytic anemia, agranulocytosis, rashes, methemoglobinemia, kidney failure, peripheral neuropathy, headaches, GI upset, and liver toxicity.  Patients who start dapsone require monitoring including baseline LFTs and weekly CBCs for the first month, then every month thereafter.  The patient verbalized understanding of the proper use and possible adverse effects of dapsone.  All of the patient's questions and concerns were addressed.
Sarecycline Counseling: Patient advised regarding possible photosensitivity and discoloration of the teeth, skin, lips, tongue and gums.  Patient instructed to avoid sunlight, if possible.  When exposed to sunlight, patients should wear protective clothing, sunglasses, and sunscreen.  The patient was instructed to call the office immediately if the following severe adverse effects occur:  hearing changes, easy bruising/bleeding, severe headache, or vision changes.  The patient verbalized understanding of the proper use and possible adverse effects of sarecycline.  All of the patient's questions and concerns were addressed.
Topical Sulfur Applications Pregnancy And Lactation Text: This medication is Pregnancy Category C and has an unknown safety profile during pregnancy. It is unknown if this topical medication is excreted in breast milk.
Tazorac Pregnancy And Lactation Text: This medication is not safe during pregnancy. It is unknown if this medication is excreted in breast milk.
Detail Level: Zone
Topical Clindamycin Counseling: Patient counseled that this medication may cause skin irritation or allergic reactions.  In the event of skin irritation, the patient was advised to reduce the amount of the drug applied or use it less frequently.   The patient verbalized understanding of the proper use and possible adverse effects of clindamycin.  All of the patient's questions and concerns were addressed.
Spironolactone Pregnancy And Lactation Text: This medication can cause feminization of the male fetus and should be avoided during pregnancy. The active metabolite is also found in breast milk.
Aklief counseling:  Patient advised to apply a pea-sized amount only at bedtime and wait 30 minutes after washing their face before applying.  If too drying, patient may add a non-comedogenic moisturizer.  The most commonly reported side effects including irritation, redness, scaling, dryness, stinging, burning, itching, and increased risk of sunburn.  The patient verbalized understanding of the proper use and possible adverse effects of retinoids.  All of the patient's questions and concerns were addressed.
Tazorac Counseling:  Patient advised that medication is irritating and drying.  Patient may need to apply sparingly and wash off after an hour before eventually leaving it on overnight.  The patient verbalized understanding of the proper use and possible adverse effects of tazorac.  All of the patient's questions and concerns were addressed.
Topical Retinoid counseling:  Patient advised to apply a pea-sized amount only at bedtime and wait 30 minutes after washing their face before applying.  If too drying, patient may add a non-comedogenic moisturizer. The patient verbalized understanding of the proper use and possible adverse effects of retinoids.  All of the patient's questions and concerns were addressed.
Doxycycline Counseling:  Patient counseled regarding possible photosensitivity and increased risk for sunburn.  Patient instructed to avoid sunlight, if possible.  When exposed to sunlight, patients should wear protective clothing, sunglasses, and sunscreen.  The patient was instructed to call the office immediately if the following severe adverse effects occur:  hearing changes, easy bruising/bleeding, severe headache, or vision changes.  The patient verbalized understanding of the proper use and possible adverse effects of doxycycline.  All of the patient's questions and concerns were addressed.
Topical Sulfur Applications Counseling: Topical Sulfur Counseling: Patient counseled that this medication may cause skin irritation or allergic reactions.  In the event of skin irritation, the patient was advised to reduce the amount of the drug applied or use it less frequently.   The patient verbalized understanding of the proper use and possible adverse effects of topical sulfur application.  All of the patient's questions and concerns were addressed.
Detail Level: Generalized